# Patient Record
Sex: MALE | Race: WHITE | NOT HISPANIC OR LATINO | Employment: OTHER | URBAN - METROPOLITAN AREA
[De-identification: names, ages, dates, MRNs, and addresses within clinical notes are randomized per-mention and may not be internally consistent; named-entity substitution may affect disease eponyms.]

---

## 2021-08-02 ENCOUNTER — HOSPITAL ENCOUNTER (OUTPATIENT)
Facility: MEDICAL CENTER | Age: 76
End: 2021-08-03
Attending: EMERGENCY MEDICINE | Admitting: STUDENT IN AN ORGANIZED HEALTH CARE EDUCATION/TRAINING PROGRAM
Payer: MEDICARE

## 2021-08-02 ENCOUNTER — APPOINTMENT (OUTPATIENT)
Dept: RADIOLOGY | Facility: MEDICAL CENTER | Age: 76
End: 2021-08-02
Attending: EMERGENCY MEDICINE
Payer: MEDICARE

## 2021-08-02 DIAGNOSIS — R55 SYNCOPE, UNSPECIFIED SYNCOPE TYPE: ICD-10-CM

## 2021-08-02 LAB
ALBUMIN SERPL BCP-MCNC: 4.9 G/DL (ref 3.2–4.9)
ALBUMIN/GLOB SERPL: 1.3 G/DL
ALP SERPL-CCNC: 87 U/L (ref 30–99)
ALT SERPL-CCNC: 13 U/L (ref 2–50)
AMPHET UR QL SCN: NEGATIVE
ANION GAP SERPL CALC-SCNC: 16 MMOL/L (ref 7–16)
APPEARANCE UR: CLEAR
AST SERPL-CCNC: 25 U/L (ref 12–45)
BACTERIA #/AREA URNS HPF: NEGATIVE /HPF
BARBITURATES UR QL SCN: NEGATIVE
BASOPHILS # BLD AUTO: 0.8 % (ref 0–1.8)
BASOPHILS # BLD: 0.09 K/UL (ref 0–0.12)
BENZODIAZ UR QL SCN: POSITIVE
BILIRUB SERPL-MCNC: 0.4 MG/DL (ref 0.1–1.5)
BILIRUB UR QL STRIP.AUTO: NEGATIVE
BUN SERPL-MCNC: 15 MG/DL (ref 8–22)
BZE UR QL SCN: NEGATIVE
CALCIUM SERPL-MCNC: 10.2 MG/DL (ref 8.5–10.5)
CANNABINOIDS UR QL SCN: POSITIVE
CHLORIDE SERPL-SCNC: 103 MMOL/L (ref 96–112)
CO2 SERPL-SCNC: 24 MMOL/L (ref 20–33)
COLOR UR: YELLOW
CREAT SERPL-MCNC: 0.73 MG/DL (ref 0.5–1.4)
EKG IMPRESSION: NORMAL
EOSINOPHIL # BLD AUTO: 0.14 K/UL (ref 0–0.51)
EOSINOPHIL NFR BLD: 1.2 % (ref 0–6.9)
EPI CELLS #/AREA URNS HPF: NEGATIVE /HPF
ERYTHROCYTE [DISTWIDTH] IN BLOOD BY AUTOMATED COUNT: 49.5 FL (ref 35.9–50)
ETHANOL BLD-MCNC: <10.1 MG/DL (ref 0–10)
GLOBULIN SER CALC-MCNC: 3.9 G/DL (ref 1.9–3.5)
GLUCOSE SERPL-MCNC: 108 MG/DL (ref 65–99)
GLUCOSE UR STRIP.AUTO-MCNC: NEGATIVE MG/DL
HCT VFR BLD AUTO: 45.8 % (ref 42–52)
HGB BLD-MCNC: 15.6 G/DL (ref 14–18)
HYALINE CASTS #/AREA URNS LPF: ABNORMAL /LPF
IMM GRANULOCYTES # BLD AUTO: 0.06 K/UL (ref 0–0.11)
IMM GRANULOCYTES NFR BLD AUTO: 0.5 % (ref 0–0.9)
KETONES UR STRIP.AUTO-MCNC: ABNORMAL MG/DL
LACTATE BLD-SCNC: 2.4 MMOL/L (ref 0.5–2)
LEUKOCYTE ESTERASE UR QL STRIP.AUTO: NEGATIVE
LYMPHOCYTES # BLD AUTO: 1.59 K/UL (ref 1–4.8)
LYMPHOCYTES NFR BLD: 13.8 % (ref 22–41)
MCH RBC QN AUTO: 33.2 PG (ref 27–33)
MCHC RBC AUTO-ENTMCNC: 34.1 G/DL (ref 33.7–35.3)
MCV RBC AUTO: 97.4 FL (ref 81.4–97.8)
METHADONE UR QL SCN: NEGATIVE
MICRO URNS: ABNORMAL
MONOCYTES # BLD AUTO: 0.77 K/UL (ref 0–0.85)
MONOCYTES NFR BLD AUTO: 6.7 % (ref 0–13.4)
NEUTROPHILS # BLD AUTO: 8.84 K/UL (ref 1.82–7.42)
NEUTROPHILS NFR BLD: 77 % (ref 44–72)
NITRITE UR QL STRIP.AUTO: NEGATIVE
NRBC # BLD AUTO: 0 K/UL
NRBC BLD-RTO: 0 /100 WBC
OPIATES UR QL SCN: NEGATIVE
OXYCODONE UR QL SCN: POSITIVE
PCP UR QL SCN: NEGATIVE
PH UR STRIP.AUTO: 5.5 [PH] (ref 5–8)
PLATELET # BLD AUTO: 276 K/UL (ref 164–446)
PMV BLD AUTO: 11.1 FL (ref 9–12.9)
POTASSIUM SERPL-SCNC: 4.4 MMOL/L (ref 3.6–5.5)
PROPOXYPH UR QL SCN: NEGATIVE
PROT SERPL-MCNC: 8.8 G/DL (ref 6–8.2)
PROT UR QL STRIP: 30 MG/DL
RBC # BLD AUTO: 4.7 M/UL (ref 4.7–6.1)
RBC # URNS HPF: ABNORMAL /HPF
RBC UR QL AUTO: NEGATIVE
SODIUM SERPL-SCNC: 143 MMOL/L (ref 135–145)
SP GR UR STRIP.AUTO: 1.02
TROPONIN T SERPL-MCNC: 28 NG/L (ref 6–19)
UROBILINOGEN UR STRIP.AUTO-MCNC: 0.2 MG/DL
WBC # BLD AUTO: 11.5 K/UL (ref 4.8–10.8)
WBC #/AREA URNS HPF: ABNORMAL /HPF

## 2021-08-02 PROCEDURE — 80053 COMPREHEN METABOLIC PANEL: CPT

## 2021-08-02 PROCEDURE — 71045 X-RAY EXAM CHEST 1 VIEW: CPT

## 2021-08-02 PROCEDURE — 82077 ASSAY SPEC XCP UR&BREATH IA: CPT

## 2021-08-02 PROCEDURE — 99285 EMERGENCY DEPT VISIT HI MDM: CPT

## 2021-08-02 PROCEDURE — 83605 ASSAY OF LACTIC ACID: CPT

## 2021-08-02 PROCEDURE — 81001 URINALYSIS AUTO W/SCOPE: CPT | Mod: XU

## 2021-08-02 PROCEDURE — 83735 ASSAY OF MAGNESIUM: CPT

## 2021-08-02 PROCEDURE — 80307 DRUG TEST PRSMV CHEM ANLYZR: CPT

## 2021-08-02 PROCEDURE — 85025 COMPLETE CBC W/AUTO DIFF WBC: CPT

## 2021-08-02 PROCEDURE — 70450 CT HEAD/BRAIN W/O DYE: CPT | Mod: MG

## 2021-08-02 PROCEDURE — 93005 ELECTROCARDIOGRAM TRACING: CPT | Performed by: EMERGENCY MEDICINE

## 2021-08-02 PROCEDURE — 93005 ELECTROCARDIOGRAM TRACING: CPT

## 2021-08-02 PROCEDURE — 84484 ASSAY OF TROPONIN QUANT: CPT

## 2021-08-02 PROCEDURE — 71275 CT ANGIOGRAPHY CHEST: CPT | Mod: ME

## 2021-08-03 ENCOUNTER — APPOINTMENT (OUTPATIENT)
Dept: RADIOLOGY | Facility: MEDICAL CENTER | Age: 76
End: 2021-08-03
Attending: STUDENT IN AN ORGANIZED HEALTH CARE EDUCATION/TRAINING PROGRAM
Payer: MEDICARE

## 2021-08-03 ENCOUNTER — APPOINTMENT (OUTPATIENT)
Dept: CARDIOLOGY | Facility: MEDICAL CENTER | Age: 76
End: 2021-08-03
Attending: STUDENT IN AN ORGANIZED HEALTH CARE EDUCATION/TRAINING PROGRAM
Payer: MEDICARE

## 2021-08-03 VITALS
RESPIRATION RATE: 18 BRPM | HEIGHT: 69 IN | TEMPERATURE: 98 F | HEART RATE: 72 BPM | SYSTOLIC BLOOD PRESSURE: 181 MMHG | BODY MASS INDEX: 25.7 KG/M2 | DIASTOLIC BLOOD PRESSURE: 91 MMHG | WEIGHT: 173.5 LBS | OXYGEN SATURATION: 92 %

## 2021-08-03 PROBLEM — R55 SYNCOPE: Status: RESOLVED | Noted: 2021-08-03 | Resolved: 2021-08-03

## 2021-08-03 PROBLEM — R55 SYNCOPE: Status: ACTIVE | Noted: 2021-08-03

## 2021-08-03 PROBLEM — Z86.79 HISTORY OF CAROTID STENOSIS: Status: ACTIVE | Noted: 2021-08-03

## 2021-08-03 PROBLEM — I21.4 NSTEMI (NON-ST ELEVATED MYOCARDIAL INFARCTION) (HCC): Status: RESOLVED | Noted: 2021-08-03 | Resolved: 2021-08-03

## 2021-08-03 PROBLEM — D72.829 LEUKOCYTOSIS: Status: ACTIVE | Noted: 2021-08-03

## 2021-08-03 PROBLEM — I95.1 ORTHOSTATIC HYPOTENSION: Status: ACTIVE | Noted: 2021-08-03

## 2021-08-03 PROBLEM — I10 ESSENTIAL HYPERTENSION: Status: ACTIVE | Noted: 2021-08-03

## 2021-08-03 PROBLEM — F19.10 POLYSUBSTANCE ABUSE (HCC): Status: ACTIVE | Noted: 2021-08-03

## 2021-08-03 PROBLEM — I21.4 NSTEMI (NON-ST ELEVATED MYOCARDIAL INFARCTION) (HCC): Status: ACTIVE | Noted: 2021-08-03

## 2021-08-03 LAB
ANION GAP SERPL CALC-SCNC: 12 MMOL/L (ref 7–16)
BASOPHILS # BLD AUTO: 0.9 % (ref 0–1.8)
BASOPHILS # BLD: 0.1 K/UL (ref 0–0.12)
BUN SERPL-MCNC: 17 MG/DL (ref 8–22)
CALCIUM SERPL-MCNC: 9.3 MG/DL (ref 8.5–10.5)
CHLORIDE SERPL-SCNC: 106 MMOL/L (ref 96–112)
CO2 SERPL-SCNC: 25 MMOL/L (ref 20–33)
CREAT SERPL-MCNC: 0.53 MG/DL (ref 0.5–1.4)
EOSINOPHIL # BLD AUTO: 0.41 K/UL (ref 0–0.51)
EOSINOPHIL NFR BLD: 3.7 % (ref 0–6.9)
ERYTHROCYTE [DISTWIDTH] IN BLOOD BY AUTOMATED COUNT: 49.1 FL (ref 35.9–50)
GLUCOSE SERPL-MCNC: 83 MG/DL (ref 65–99)
HCT VFR BLD AUTO: 43.6 % (ref 42–52)
HGB BLD-MCNC: 14.9 G/DL (ref 14–18)
IMM GRANULOCYTES # BLD AUTO: 0.05 K/UL (ref 0–0.11)
IMM GRANULOCYTES NFR BLD AUTO: 0.5 % (ref 0–0.9)
LACTATE BLD-SCNC: 1.4 MMOL/L (ref 0.5–2)
LACTATE BLD-SCNC: 1.8 MMOL/L (ref 0.5–2)
LACTATE BLD-SCNC: 1.9 MMOL/L (ref 0.5–2)
LV EJECT FRACT  99904: 40
LV EJECT FRACT MOD 2C 99903: 46.47
LV EJECT FRACT MOD 4C 99902: 40.1
LV EJECT FRACT MOD BP 99901: 42.91
LYMPHOCYTES # BLD AUTO: 2.55 K/UL (ref 1–4.8)
LYMPHOCYTES NFR BLD: 23.2 % (ref 22–41)
MAGNESIUM SERPL-MCNC: 2.3 MG/DL (ref 1.5–2.5)
MCH RBC QN AUTO: 33.2 PG (ref 27–33)
MCHC RBC AUTO-ENTMCNC: 34.2 G/DL (ref 33.7–35.3)
MCV RBC AUTO: 97.1 FL (ref 81.4–97.8)
MONOCYTES # BLD AUTO: 0.99 K/UL (ref 0–0.85)
MONOCYTES NFR BLD AUTO: 9 % (ref 0–13.4)
NEUTROPHILS # BLD AUTO: 6.9 K/UL (ref 1.82–7.42)
NEUTROPHILS NFR BLD: 62.7 % (ref 44–72)
NRBC # BLD AUTO: 0 K/UL
NRBC BLD-RTO: 0 /100 WBC
PLATELET # BLD AUTO: 291 K/UL (ref 164–446)
PMV BLD AUTO: 11.5 FL (ref 9–12.9)
POTASSIUM SERPL-SCNC: 3.7 MMOL/L (ref 3.6–5.5)
RBC # BLD AUTO: 4.49 M/UL (ref 4.7–6.1)
SODIUM SERPL-SCNC: 143 MMOL/L (ref 135–145)
WBC # BLD AUTO: 11 K/UL (ref 4.8–10.8)

## 2021-08-03 PROCEDURE — 93306 TTE W/DOPPLER COMPLETE: CPT

## 2021-08-03 PROCEDURE — 80048 BASIC METABOLIC PNL TOTAL CA: CPT

## 2021-08-03 PROCEDURE — G0378 HOSPITAL OBSERVATION PER HR: HCPCS

## 2021-08-03 PROCEDURE — 700105 HCHG RX REV CODE 258: Performed by: NURSE PRACTITIONER

## 2021-08-03 PROCEDURE — A9270 NON-COVERED ITEM OR SERVICE: HCPCS | Performed by: STUDENT IN AN ORGANIZED HEALTH CARE EDUCATION/TRAINING PROGRAM

## 2021-08-03 PROCEDURE — 700111 HCHG RX REV CODE 636 W/ 250 OVERRIDE (IP): Performed by: STUDENT IN AN ORGANIZED HEALTH CARE EDUCATION/TRAINING PROGRAM

## 2021-08-03 PROCEDURE — 96372 THER/PROPH/DIAG INJ SC/IM: CPT

## 2021-08-03 PROCEDURE — 93880 EXTRACRANIAL BILAT STUDY: CPT

## 2021-08-03 PROCEDURE — 700102 HCHG RX REV CODE 250 W/ 637 OVERRIDE(OP): Performed by: STUDENT IN AN ORGANIZED HEALTH CARE EDUCATION/TRAINING PROGRAM

## 2021-08-03 PROCEDURE — 83605 ASSAY OF LACTIC ACID: CPT | Mod: 91

## 2021-08-03 PROCEDURE — 97165 OT EVAL LOW COMPLEX 30 MIN: CPT

## 2021-08-03 PROCEDURE — 94664 DEMO&/EVAL PT USE INHALER: CPT

## 2021-08-03 PROCEDURE — 700117 HCHG RX CONTRAST REV CODE 255: Performed by: EMERGENCY MEDICINE

## 2021-08-03 PROCEDURE — 85025 COMPLETE CBC W/AUTO DIFF WBC: CPT

## 2021-08-03 PROCEDURE — 99236 HOSP IP/OBS SAME DATE HI 85: CPT | Performed by: STUDENT IN AN ORGANIZED HEALTH CARE EDUCATION/TRAINING PROGRAM

## 2021-08-03 PROCEDURE — 93306 TTE W/DOPPLER COMPLETE: CPT | Mod: 26 | Performed by: INTERNAL MEDICINE

## 2021-08-03 RX ORDER — ALLOPURINOL 100 MG/1
100 TABLET ORAL DAILY
COMMUNITY
Start: 2021-04-20

## 2021-08-03 RX ORDER — SODIUM CHLORIDE 9 MG/ML
500 INJECTION, SOLUTION INTRAVENOUS ONCE
Status: COMPLETED | OUTPATIENT
Start: 2021-08-03 | End: 2021-08-03

## 2021-08-03 RX ORDER — FUROSEMIDE 40 MG/1
20 TABLET ORAL 2 TIMES DAILY
Status: DISCONTINUED | OUTPATIENT
Start: 2021-08-03 | End: 2021-08-03 | Stop reason: HOSPADM

## 2021-08-03 RX ORDER — BISACODYL 10 MG
10 SUPPOSITORY, RECTAL RECTAL
Status: DISCONTINUED | OUTPATIENT
Start: 2021-08-03 | End: 2021-08-03 | Stop reason: HOSPADM

## 2021-08-03 RX ORDER — FINASTERIDE 5 MG/1
5 TABLET, FILM COATED ORAL EVERY MORNING
Status: SHIPPED | COMMUNITY
End: 2024-03-04 | Stop reason: SDUPTHER

## 2021-08-03 RX ORDER — ONDANSETRON 2 MG/ML
4 INJECTION INTRAMUSCULAR; INTRAVENOUS EVERY 4 HOURS PRN
Status: DISCONTINUED | OUTPATIENT
Start: 2021-08-03 | End: 2021-08-03 | Stop reason: HOSPADM

## 2021-08-03 RX ORDER — CLOPIDOGREL BISULFATE 75 MG/1
75 TABLET ORAL DAILY
Status: DISCONTINUED | OUTPATIENT
Start: 2021-08-03 | End: 2021-08-03 | Stop reason: HOSPADM

## 2021-08-03 RX ORDER — ENALAPRILAT 1.25 MG/ML
1.25 INJECTION INTRAVENOUS EVERY 6 HOURS PRN
Status: DISCONTINUED | OUTPATIENT
Start: 2021-08-03 | End: 2021-08-03 | Stop reason: HOSPADM

## 2021-08-03 RX ORDER — HEPARIN SODIUM 5000 [USP'U]/ML
5000 INJECTION, SOLUTION INTRAVENOUS; SUBCUTANEOUS EVERY 8 HOURS
Status: DISCONTINUED | OUTPATIENT
Start: 2021-08-03 | End: 2021-08-03 | Stop reason: HOSPADM

## 2021-08-03 RX ORDER — HYDROMORPHONE HYDROCHLORIDE 1 MG/ML
0.25 INJECTION, SOLUTION INTRAMUSCULAR; INTRAVENOUS; SUBCUTANEOUS
Status: DISCONTINUED | OUTPATIENT
Start: 2021-08-03 | End: 2021-08-03 | Stop reason: HOSPADM

## 2021-08-03 RX ORDER — OXYCODONE HYDROCHLORIDE 5 MG/1
5 TABLET ORAL
Status: DISCONTINUED | OUTPATIENT
Start: 2021-08-03 | End: 2021-08-03 | Stop reason: HOSPADM

## 2021-08-03 RX ORDER — POLYETHYLENE GLYCOL 3350 17 G/17G
1 POWDER, FOR SOLUTION ORAL
Status: DISCONTINUED | OUTPATIENT
Start: 2021-08-03 | End: 2021-08-03 | Stop reason: HOSPADM

## 2021-08-03 RX ORDER — LABETALOL HYDROCHLORIDE 5 MG/ML
10 INJECTION, SOLUTION INTRAVENOUS EVERY 4 HOURS PRN
Status: DISCONTINUED | OUTPATIENT
Start: 2021-08-03 | End: 2021-08-03 | Stop reason: HOSPADM

## 2021-08-03 RX ORDER — ACETAMINOPHEN 325 MG/1
650 TABLET ORAL EVERY 6 HOURS PRN
Status: DISCONTINUED | OUTPATIENT
Start: 2021-08-03 | End: 2021-08-03 | Stop reason: HOSPADM

## 2021-08-03 RX ORDER — ONDANSETRON 4 MG/1
4 TABLET, ORALLY DISINTEGRATING ORAL EVERY 4 HOURS PRN
Status: DISCONTINUED | OUTPATIENT
Start: 2021-08-03 | End: 2021-08-03 | Stop reason: HOSPADM

## 2021-08-03 RX ORDER — OXYCODONE HYDROCHLORIDE 5 MG/1
2.5 TABLET ORAL
Status: DISCONTINUED | OUTPATIENT
Start: 2021-08-03 | End: 2021-08-03 | Stop reason: HOSPADM

## 2021-08-03 RX ORDER — METOPROLOL SUCCINATE 25 MG/1
25 TABLET, EXTENDED RELEASE ORAL DAILY
Status: DISCONTINUED | OUTPATIENT
Start: 2021-08-03 | End: 2021-08-03 | Stop reason: HOSPADM

## 2021-08-03 RX ORDER — AMOXICILLIN 250 MG
2 CAPSULE ORAL 2 TIMES DAILY
Status: DISCONTINUED | OUTPATIENT
Start: 2021-08-03 | End: 2021-08-03 | Stop reason: HOSPADM

## 2021-08-03 RX ORDER — CELECOXIB 200 MG/1
200 CAPSULE ORAL DAILY
Status: SHIPPED | COMMUNITY
End: 2024-02-13 | Stop reason: SINTOL

## 2021-08-03 RX ORDER — ALBUTEROL SULFATE 90 UG/1
2 AEROSOL, METERED RESPIRATORY (INHALATION) EVERY 4 HOURS PRN
Status: SHIPPED | COMMUNITY
End: 2024-02-13 | Stop reason: SINTOL

## 2021-08-03 RX ORDER — LORAZEPAM 0.5 MG
1 TABLET ORAL EVERY MORNING
Status: SHIPPED | COMMUNITY
End: 2024-02-13 | Stop reason: CLARIF

## 2021-08-03 RX ORDER — TAMSULOSIN HYDROCHLORIDE 0.4 MG/1
0.4 CAPSULE ORAL
Status: DISCONTINUED | OUTPATIENT
Start: 2021-08-03 | End: 2021-08-03 | Stop reason: HOSPADM

## 2021-08-03 RX ADMIN — HEPARIN SODIUM 5000 UNITS: 5000 INJECTION, SOLUTION INTRAVENOUS; SUBCUTANEOUS at 06:18

## 2021-08-03 RX ADMIN — SODIUM CHLORIDE 500 ML: 9 INJECTION, SOLUTION INTRAVENOUS at 12:41

## 2021-08-03 RX ADMIN — CLOPIDOGREL BISULFATE 75 MG: 75 TABLET ORAL at 06:18

## 2021-08-03 RX ADMIN — OXYCODONE 5 MG: 5 TABLET ORAL at 02:13

## 2021-08-03 RX ADMIN — IOHEXOL 50 ML: 350 INJECTION, SOLUTION INTRAVENOUS at 00:15

## 2021-08-03 RX ADMIN — TAMSULOSIN HYDROCHLORIDE 0.4 MG: 0.4 CAPSULE ORAL at 10:50

## 2021-08-03 RX ADMIN — OXYCODONE 5 MG: 5 TABLET ORAL at 09:35

## 2021-08-03 RX ADMIN — FUROSEMIDE 20 MG: 40 TABLET ORAL at 06:18

## 2021-08-03 RX ADMIN — OXYCODONE 5 MG: 5 TABLET ORAL at 15:06

## 2021-08-03 ASSESSMENT — LIFESTYLE VARIABLES
HAVE PEOPLE ANNOYED YOU BY CRITICIZING YOUR DRINKING: NO
ON A TYPICAL DAY WHEN YOU DRINK ALCOHOL HOW MANY DRINKS DO YOU HAVE: 0
AVERAGE NUMBER OF DAYS PER WEEK YOU HAVE A DRINK CONTAINING ALCOHOL: 0
HOW MANY TIMES IN THE PAST YEAR HAVE YOU HAD 5 OR MORE DRINKS IN A DAY: 0
TOTAL SCORE: 0
ALCOHOL_USE: YES
EVER FELT BAD OR GUILTY ABOUT YOUR DRINKING: NO
TOTAL SCORE: 0
TOTAL SCORE: 0
EVER HAD A DRINK FIRST THING IN THE MORNING TO STEADY YOUR NERVES TO GET RID OF A HANGOVER: NO
DOES PATIENT WANT TO TALK TO SOMEONE ABOUT QUITTING: NO
HAVE YOU EVER FELT YOU SHOULD CUT DOWN ON YOUR DRINKING: NO
DOES PATIENT WANT TO STOP DRINKING: YES
CONSUMPTION TOTAL: NEGATIVE

## 2021-08-03 ASSESSMENT — COGNITIVE AND FUNCTIONAL STATUS - GENERAL
SUGGESTED CMS G CODE MODIFIER DAILY ACTIVITY: CH
DAILY ACTIVITIY SCORE: 24

## 2021-08-03 ASSESSMENT — ENCOUNTER SYMPTOMS
COUGH: 0
SHORTNESS OF BREATH: 0
FEVER: 0
WHEEZING: 0
HEADACHES: 0
DOUBLE VISION: 0
PALPITATIONS: 0
SORE THROAT: 1
BACK PAIN: 0
DEPRESSION: 0
ABDOMINAL PAIN: 0
CHILLS: 0
NAUSEA: 0
HEARTBURN: 0
NECK PAIN: 0
VOMITING: 0
FOCAL WEAKNESS: 0
CONSTIPATION: 0
FALLS: 1
LOSS OF CONSCIOUSNESS: 1
BLURRED VISION: 0
INSOMNIA: 0
BLOOD IN STOOL: 0
WEAKNESS: 0
BRUISES/BLEEDS EASILY: 0
DIARRHEA: 0

## 2021-08-03 ASSESSMENT — PATIENT HEALTH QUESTIONNAIRE - PHQ9
2. FEELING DOWN, DEPRESSED, IRRITABLE, OR HOPELESS: NOT AT ALL
1. LITTLE INTEREST OR PLEASURE IN DOING THINGS: NOT AT ALL
SUM OF ALL RESPONSES TO PHQ9 QUESTIONS 1 AND 2: 0

## 2021-08-03 ASSESSMENT — FIBROSIS 4 INDEX
FIB4 SCORE: 1.88
FIB4 SCORE: 1.88

## 2021-08-03 ASSESSMENT — PAIN DESCRIPTION - PAIN TYPE
TYPE: CHRONIC PAIN

## 2021-08-03 ASSESSMENT — ACTIVITIES OF DAILY LIVING (ADL): TOILETING: INDEPENDENT

## 2021-08-03 NOTE — ASSESSMENT & PLAN NOTE
Likely NSTEMI type II as patient had hypotensive event and syncopized  No ST segment elevations or depressions on twelve-lead EKG  Trend troponin

## 2021-08-03 NOTE — ED NOTES
Med Rec completed: per patient at bedside with wife via phone  Preferred Pharmacy: [facility]  Allergies:  No Known Allergies    No ORAL antibiotics in last 14 days    Home Medications:  Medication Sig Comments   • finasteride (PROSCAR) 5 MG Tab Take 5 mg by mouth every morning.    • aspirin EC (ECOTRIN) 81 MG Tablet Delayed Response Take 81 mg by mouth every day.    • celecoxib (CELEBREX) 200 MG Cap Take 200 mg by mouth every day.    • allopurinol (ZYLOPRIM) 100 MG Tab Take 100 mg by mouth every day.    • albuterol 108 (90 Base) MCG/ACT Aero Soln inhalation aerosol Inhale 2 Puffs every four hours as needed for Shortness of Breath.    • GLUCOSAMINE-CHONDROITIN PO Take 1 tablet by mouth every day.    • Probiotic Product (PROBIOTIC PO) Take 1 capsule by mouth every day.    • Misc Natural Products (TART MILLER ADVANCED) Cap Take 1 capsule by mouth every morning.    • Budeson-Glycopyrrol-Formoterol (BREZTRI AEROSPHERE) 160-9-4.8 MCG/ACT Aerosol Inhale 2 Puffs 2 times a day.    • tamsulosin (FLOMAX) 0.4 MG capsule Take 0.4 mg by mouth ONE-HALF HOUR AFTER BREAKFAST.    • oxyCODONE immediate release (ROXICODONE) 10 MG immediate release tablet Take 10 mg by mouth every four hours as needed (pain). Max of 5 tabs daily    • metoprolol SR (TOPROL XL) 25 MG TABLET SR 24 HR Take 25 mg by mouth every day.    • furosemide (LASIX) 20 MG Tab Take 20 mg by mouth every day.    • clopidogrel (PLAVIX) 75 MG TABS Take 75 mg by mouth every morning.    • omeprazole (PRILOSEC) 20 MG delayed-release capsule Take 20 mg by mouth every 48 hours.
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
## 2021-08-03 NOTE — THERAPY
"Occupational Therapy   Initial Evaluation     Patient Name: Slade Adam  Age:  75 y.o., Sex:  male  Medical Record #: [id number]  Today's Date: 8/3/2021     Precautions  Precautions: Fall Risk    Assessment  Patient is 75 y.o. male who presentsed to the ED for evaluation following a syncopal episode prior to arrival. PMHx: NSTEMI and cartotid stenosis, COPD, BPH, CHF,HTN. Pt appears near/ at functional baseline with ADLs, activity tolerance, and functional mobility. Pt reports having good support in home setting, however wife is not home at all times to help. Pt will not be actively followed for OT services at this time, however may be seen if requested by physician for 1 more visit w/ in 30 days to address any d/c or equipment needs.     Plan  Recommend Occupational Therapy d/c needs only    DC Equipment Recommendations: None  Discharge Recommendations: Anticipate that the patient will have no further occupational therapy needs after discharge from the hospital     Subjective  \"I am just ready to go home\"     Objective   08/03/21 1012   Initial Contact Note    Initial Contact Note Order Received and Verified, Occupational Therapy Evaluation in Progress with Full Report to Follow.   Prior Living Situation   Prior Services Home-Independent   Housing / Facility 1 Story House   Steps Into Home 3   Steps In Home 0   Bathroom Set up Bathtub / Shower Combination   Equipment Owned    Lives with - Patient's Self Care Capacity Spouse   Comments Pt lives with wife, wife works    Prior Level of ADL Function   Self Feeding Independent   Grooming / Hygiene Independent   Bathing Independent   Dressing Independent   Toileting Independent   Prior Level of IADL Function   Medication Management Independent   Laundry Independent   Kitchen Mobility Independent   Finances Independent   Home Management Independent   Shopping Independent   Prior Level Of Mobility Independent Without Device in Community   Driving / Transportation Driving " Independent   Occupation (Pre-Hospital Vocational) Retired Due To Age   Leisure Interests Gardening   History of Falls   History of Falls No   Precautions   Precautions Fall Risk   Pain   Pain Scales 0 to 10 Scale    Intervention Ambulation / Increased Activity   Non Verbal Scale  Calm  (emotional/ tearful at end of session, pt very anxious )   Pain 0 - 10 Group   Location Back;Leg   Location Orientation Posterior;Right   Therapist Pain Assessment Post Activity Pain Same as Prior to Activity;Nurse Notified  (no number attained by pt)   Cognition    Cognition / Consciousness WDL   Level of Consciousness Alert   Comments slightly impulsive stating he wants to and is ready to go home   Passive ROM Upper Body   Passive ROM Upper Body WDL   Active ROM Upper Body   Active ROM Upper Body  WDL   Strength Upper Body   Upper Body Strength  WDL   Sensation Upper Body   Upper Extremity Sensation  WDL   Upper Body Muscle Tone   Upper Body Muscle Tone  WDL   Coordination Upper Body   Coordination WDL   Balance Assessment   Sitting Balance (Static) Fair +   Sitting Balance (Dynamic) Fair +   Standing Balance (Static) Fair +   Standing Balance (Dynamic) Fair +   Weight Shift Sitting Good   Weight Shift Standing Good   Bed Mobility    Supine to Sit Supervised   Sit to Supine Supervised   Scooting Supervised   ADL Assessment   Eating Supervision  (pt has slight tremors while eatting)   Grooming Supervision;Standing   Upper Body Dressing Supervision   Lower Body Dressing Supervision   How much help from another person does the patient currently need...   6 Clicks Daily Activity Score 24   Functional Mobility   Sit to Stand Supervised   Bed, Chair, Wheelchair Transfer Supervised   Transfer Method Stand Step   Comments no AD   Visual Perception   Visual Perception  WDL   Edema / Skin Assessment   Edema / Skin  WDL   Activity Tolerance   Comments appears near/ at functinoal baseline   Short Term Goals   Short Term Goal # 1 pt will have  no further acute OT needs   Education Group   Education Provided Role of Occupational Therapist;Activities of Daily Living   Role of Occupational Therapist Patient Response Patient;Acceptance;Explanation;Verbal Demonstration   ADL Patient Response Patient;Acceptance;Explanation;Verbal Demonstration;Action Demonstration   Anticipated Discharge Equipment and Recommendations   DC Equipment Recommendations None   Discharge Recommendations Anticipate that the patient will have no further occupational therapy needs after discharge from the hospital   Interdisciplinary Plan of Care Collaboration   IDT Collaboration with  Nursing   Patient Position at End of Therapy In Bed;Tray Table within Reach;Phone within Reach;Call Light within Reach   Collaboration Comments nsg notified

## 2021-08-03 NOTE — ED PROVIDER NOTES
"ED Provider Note    Scribed for Elysia Perry M.D. by Josue Cortez-Reyes. 8/2/2021  7:07 PM    Primary care provider: Gloria Maurice M.D.  Means of arrival: EMS  History obtained from: Patient  History limited by: None  CHIEF COMPLAINT  Chief Complaint   Patient presents with   • Syncope       HPI  Slade Adam is a 75 y.o. male who presents to the ED for evaluation following a syncopal episode prior to arrival. The patient states that he was sitting on the toilet talking to his wife in the other room when he suddenly lost consciousness. He states that he felt fine prior to losing consciousness.  He had no symptoms prior to syncope.  Patient is denying any dizziness, headache, chest pain, abdominal pain, back pain, or shortness of breath. The patient's friend states that the patient was a little more shaky than normal through out the day to day but otherwise okay. The patient denies any additional vomiting, diarrhea, dysuria, numbness, weakness, or tingling. The patient also denies any neck pain or head pain secondary to his fall. He states that he recently saw his urologist for evaluation of a bladder issue (difficulty fully emptying his bladder). The patient denies smoking cigarettes but notes that he occassionally smokes \"pot\". He adds that he normally does not consume alcohol because of his gout; however, he did consume alcohol today. He states that he has a history of asthma noting that he uses an inhaler. The patient states that he has had his gallbladder removed in the past. The patient states that he has a history of heart failure; noting that he is compliant with his medication. He states that he has been vaccinated for COVID. The patient adds that he is currently seeing a pulmonologist for evaluation of his COPD.  Wife called EMS.  Care flight came to their home which is located outside of [place].  Upon arrival patient had dilated pupils.  He was completely unresponsive with a GCS of 3.  EMS was " just preparing to intubate him when he suddenly woke up.  He was flown here to [facility] for further evaluation.    Wife, who arrives to ER after patient, says that she witnessed the patient having a brief episode of slurred or garbled speech and then he passed out with his eyes wide open.  She says he always had pinpoint pupils.  She does not think he had overdose.  He did get a little rigid.  Patient was unconscious for about 4 to 5 minutes before EMS arrived.  She said he had some gurgling breaths.  EMS arrived and was bagging him.  They drilled an IO into his right tibia.  Right before they drilled the IO he started to come around a little bit.  He then had a passed out again.  They gave him 2 of Versed because when he started to come to again he was a little combative.  No laurie seizure-like activity.  Wife says he has been on narcotics for many years but does not think he overdosed.  She did not check a pulse during this time.    REVIEW OF SYSTEMS  See HPI for further details. All other systems are negative.  Pertinent positives include loss of consciousness. Pertinent negatives include no dizziness, headache, chest pain, abdominal pain, shortness of breath, vomiting, diarrhea, dysuria, numbness, weakness, tingling, neck, or head pain. .     PAST MEDICAL HISTORY  Past Medical History:   Diagnosis Date   • BPH (benign prostatic hyperplasia)    • Chronic obstructive pulmonary disease (HCC)    • Congestive heart failure (HCC)    • Hypertension    • Osteoarthritis        FAMILY HISTORY  History reviewed. No pertinent family history.    SOCIAL HISTORY  Social History     Socioeconomic History   • Marital status: Single     Spouse name: Not noted   • Number of children: Not noted   • Years of education: Not noted   • Highest education level: Not noted   Occupational History   • Not on file   Tobacco Use   • Smoking status: Never Smoker   • Smokeless tobacco: Never Used   Substance and Sexual Activity   •  Alcohol use: No   • Drug use: No   • Sexual activity: Not noted   Other Topics Concern   • Not noted   Social History Narrative   • Not noted     Social Determinants of Health     Financial Resource Strain:    • Difficulty of Paying Living Expenses:    Food Insecurity:    • Worried About Running Out of Food in the Last Year:    • Ran Out of Food in the Last Year:    Transportation Needs:    • Lack of Transportation (Medical):    • Lack of Transportation (Non-Medical):    Physical Activity:    • Days of Exercise per Week:    • Minutes of Exercise per Session:    Stress:    • Feeling of Stress :    Social Connections:    • Frequency of Communication with Friends and Family:    • Frequency of Social Gatherings with Friends and Family:    • Attends [employer] Services:    • Active Member of Clubs or Organizations:    • Attends Club or Organization Meetings:    • Marital Status:    Intimate Partner Violence:    • Fear of Current or Ex-Partner:    • Emotionally Abused:    • Physically Abused:    • Sexually Abused:        SURGICAL HISTORY  Past Surgical History:   Procedure Laterality Date   • CAROTID STENT ANGIOGRAPHY     • LUMBAR FUSION POSTERIOR     • OTHER CARDIAC SURGERY     • OTHER ORTHOPEDIC SURGERY     • SHOULDER SURGERY Right        CURRENT MEDICATIONS  Current Outpatient Medications   Medication Instructions   • albuterol (PROVENTIL) 2.5 mg, Nebulization, EVERY 4 HOURS PRN   • aspirin (ASA) 325 mg, EVERY 6 HOURS PRN   • clopidogrel (PLAVIX) 75 mg, DAILY   • fentaNYL (DURAGESIC) 75 MCG/HR PATCH 72 HR 1 PATCH   • furosemide (LASIX) 20 mg, Oral, 2 TIMES DAILY   • metoprolol SR (TOPROL XL) 25 mg, Oral, DAILY   • omeprazole (PRILOSEC) 20 mg, DAILY   • oxyCODONE immediate release (ROXICODONE) 10 mg, Oral, 3 TIMES DAILY   • tamsulosin (FLOMAX) 0.4 mg, Oral, AFTER BREAKFAST       ALLERGIES  No Known Allergies    PHYSICAL EXAM  VITAL SIGNS: /77   Pulse 79   Temp 36.5 °C (97.7 °F) (Temporal)   Resp 18   Ht 1.753 m  "(5' 9\")   Wt 86.2 kg (190 lb)   SpO2 93%   BMI 28.06 kg/m²      Constitutional: Well developed, well nourished; No acute distress; Non-toxic appearance.   HENT: Normocephalic, atraumatic; Bilateral external ears normal; slightly dry mucous membranes.   Eyes: PERRL, EOMI, Conjunctiva normal. No discharge.   Neck:  Supple, nontender midline C-spine without step-off or deformity; No stridor; No nuchal rigidity.   Lymphatic: No cervical lymphadenopathy noted.   Cardiovascular: Distant but regular rate and rhythm without murmurs, rubs, or gallop.   Thorax & Lungs: No respiratory distress, slight end expiratory wheezing in right lung field, without rales or rhonchi. Nontender chest wall. No crepitus or subcutaneous air  Abdomen: Soft, nontender, bowel sounds normal. No obvious masses; No pulsatile masses; no rebound, guarding, or peritoneal signs.   Skin: Good color; warm and dry without rash or petechia.  Back: Nontender, No CVA tenderness.   Extremities: Distal radial, dorsalis pedis, posterior tibial pulses are equal bilaterally; No edema; Nontender calves or saphenous, No cyanosis, No clubbing.   Musculoskeletal: Good range of motion in all major joints. No tenderness to palpation or major deformities noted.   Neurologic: Alert & oriented x 4, clear speech    EKG  12 lead EKG interpreted by me as noted below.   EKG timed 1836.  Sinus rhythm.  Rate of 82.  Normal axis.  Multiple PVCs.  Low voltage.  No ST elevation or depression.  T waves flat in the limb leads as well as in V5 and V6.    LABS/RADIOLOGY/PROCEDURES  Results for orders placed or performed during the hospital encounter of 08/02/21   CBC WITH DIFFERENTIAL   Result Value Ref Range    WBC 11.5 (H) 4.8 - 10.8 K/uL    RBC 4.70 4.70 - 6.10 M/uL    Hemoglobin 15.6 14.0 - 18.0 g/dL    Hematocrit 45.8 42.0 - 52.0 %    MCV 97.4 81.4 - 97.8 fL    MCH 33.2 (H) 27.0 - 33.0 pg    MCHC 34.1 33.7 - 35.3 g/dL    RDW 49.5 35.9 - 50.0 fL    Platelet Count 276 164 - 446 " K/uL    MPV 11.1 9.0 - 12.9 fL    Neutrophils-Polys 77.00 (H) 44.00 - 72.00 %    Lymphocytes 13.80 (L) 22.00 - 41.00 %    Monocytes 6.70 0.00 - 13.40 %    Eosinophils 1.20 0.00 - 6.90 %    Basophils 0.80 0.00 - 1.80 %    Immature Granulocytes 0.50 0.00 - 0.90 %    Nucleated RBC 0.00 /100 WBC    Neutrophils (Absolute) 8.84 (H) 1.82 - 7.42 K/uL    Lymphs (Absolute) 1.59 1.00 - 4.80 K/uL    Monos (Absolute) 0.77 0.00 - 0.85 K/uL    Eos (Absolute) 0.14 0.00 - 0.51 K/uL    Baso (Absolute) 0.09 0.00 - 0.12 K/uL    Immature Granulocytes (abs) 0.06 0.00 - 0.11 K/uL    NRBC (Absolute) 0.00 K/uL   LACTIC ACID   Result Value Ref Range    Lactic Acid 2.4 (H) 0.5 - 2.0 mmol/L   URINALYSIS (UA)    Specimen: Urine, Clean Catch   Result Value Ref Range    Color Yellow     Character Clear     Specific Gravity 1.022 <1.035    Ph 5.5 5.0 - 8.0    Glucose Negative Negative mg/dL    Ketones Trace (A) Negative mg/dL    Protein 30 (A) Negative mg/dL    Bilirubin Negative Negative    Urobilinogen, Urine 0.2 Negative    Nitrite Negative Negative    Leukocyte Esterase Negative Negative    Occult Blood Negative Negative    Micro Urine Req Microscopic    URINE DRUG SCREEN   Result Value Ref Range    Amphetamines Urine Negative Negative    Barbiturates Negative Negative    Benzodiazepines Positive (A) Negative    Cocaine Metabolite Negative Negative    Methadone Negative Negative    Opiates Negative Negative    Oxycodone Positive (A) Negative    Phencyclidine -Pcp Negative Negative    Propoxyphene Negative Negative    Cannabinoid Metab Positive (A) Negative   URINE MICROSCOPIC (W/UA)   Result Value Ref Range    WBC 0-2 (A) /hpf    RBC 0-2 (A) /hpf    Bacteria Negative None /hpf    Epithelial Cells Negative /hpf    Hyaline Cast 3-5 (A) /lpf   Comp Metabolic Panel   Result Value Ref Range    Sodium 143 135 - 145 mmol/L    Potassium 4.4 3.6 - 5.5 mmol/L    Chloride 103 96 - 112 mmol/L    Co2 24 20 - 33 mmol/L    Anion Gap 16.0 7.0 - 16.0     Glucose 108 (H) 65 - 99 mg/dL    Bun 15 8 - 22 mg/dL    Creatinine 0.73 0.50 - 1.40 mg/dL    Calcium 10.2 8.5 - 10.5 mg/dL    AST(SGOT) 25 12 - 45 U/L    ALT(SGPT) 13 2 - 50 U/L    Alkaline Phosphatase 87 30 - 99 U/L    Total Bilirubin 0.4 0.1 - 1.5 mg/dL    Albumin 4.9 3.2 - 4.9 g/dL    Total Protein 8.8 (H) 6.0 - 8.2 g/dL    Globulin 3.9 (H) 1.9 - 3.5 g/dL    A-G Ratio 1.3 g/dL   TROPONIN   Result Value Ref Range    Troponin T 28 (H) 6 - 19 ng/L   DIAGNOSTIC ALCOHOL   Result Value Ref Range    Diagnostic Alcohol <10.1 0.0 - 10.0 mg/dL   ESTIMATED GFR   Result Value Ref Range    GFR If African American >60 >60 mL/min/1.73 m 2    GFR If Non African American >60 >60 mL/min/1.73 m 2   EKG   Result Value Ref Range    Report       [facility]    Test Date:  2021  Pt Name:    ALEAH SOLORZANO                 Department: ER  MRN:        [id number]                      Room:       BL 14  Gender:     Male                         Technician: [id number]  :        1945                   Requested By:ER TRIAGE PROTOCOL  Order #:    [id number]                    Reading MD: Elysia Perry    Measurements  Intervals                                Axis  Rate:       82                           P:          65  MO:         173                          QRS:        -4  QRSD:       96                           T:          51  QT:         383  QTc:        446    Interpretive Statements  Sinus rhythm rate 82  Normal axis  Normal intervals  No ST elevation or depression  Multiple ventricular premature complexes  Low voltage, extremity leads  No previous ECG available for comparison  Electronically Signed On 2021 21:29:52 PDT by Elysia Perry         DX-CHEST-PORTABLE (1 VIEW)   Final Result      No acute cardiopulmonary process is seen.      CT-HEAD W/O   Final Result      1.  No acute intracranial abnormality is identified.   2.  Left frontal encephalomalacia.   3.  There are periventricular and  subcortical white matter changes present.  This finding is nonspecific and could be from previous small vessel ischemia, demyelination, or gliosis.   4.  Atrophy   5.  Paranasal sinus disease.      CT-CTA CHEST PULMONARY ARTERY W/ RECONS    (Results Pending)       COURSE & MEDICAL DECISION MAKING  Pertinent Labs & Imaging studies reviewed. (See chart for details)    7:07 PM - Patient seen and examined at bedside. Discussed plan of care, including to obtain a CT scan of his brain as well as the potential to admit him to the hospital. Patient agrees to the plan of care. Ordered for labs and imaging to evaluate his symptoms.     0030: I discussed the case with Dr. Ramires, hospitalist on-call he will kindly evaluate the patient hospitalization.      Patient presents to the ER after a syncopal episode without warning.  Report was that patient was sitting on the toilet taking off his boots.  He was talking to his wife.  Next thing he knows he is here in the ER.  Apparently patient passed out.  His pupils were dilated.  His eyes were staring up at the ceiling.  He had what sounds like either gurgling respirations or some agonal breaths.  Wife did not check her pulse during this time.  She contacted EMS and EMS arrived after about 4 to 5 minutes of him being unconscious.  They started to bag him.  He started to come around.  They drilled an IO into his tibia.  He then passed out again.  When he finally came around the second time he was combative.  They gave him Versed.  They put him in a helicopter and transferred him here.  It was reported that right before they were going to intubate him for a GCS of 3, he woke up.  He did not arrive to be intubated.  Here in the ER he is awake and alert.  He has no complaints at this time.  He does not recall having any antecedent or preceding symptoms prior to passing out.  Wife reports that his eyes were staring and wide open and that he had a little bit of rigidity of his  extremities.  No laurie seizure-like activity.  No history of seizures.  CT brain here in the ER is negative.  CT of the chest was performed as patient had some end expiratory wheezes over the right lung field with an O2 sat of 88% on room air upon arrival and syncope.  I want to rule out pulmonary embolism.  There is no PE.  EKG is nonacute.  No evidence of ST elevation or depression.  Troponin is negative.  At this time no evidence for STEMI or non-STEMI.  Patient's electrolytes are unremarkable.  Orthostatics are normal.  Vital signs are normal stable here in the ER.  Urine is clear.  He is positive for cannabis and oxycodone, which he admits to.  He is on chronic narcotics.  At this time is unclear exactly why patient had a syncopal episode.  I think he warrants further observation for rule out arrhythmia causing him to have syncope without warning.  We also need to consider seizure and MI.  I spoke with Dr. Ramires, hospitalist on-call, and he will kindly evaluate the patient hospitalization.    FINAL IMPRESSION  1. Syncope, unspecified syncope type Acute          This dictation has been created using voice recognition software. The accuracy of the dictation is limited by the abilities of the software. I expect there may be some errors of grammar and possibly content. I made every attempt to manually correct the errors within my dictation. However, errors related to voice recognition software may still exist and should be interpreted within the appropriate context.     I, Josue Cortez-Reyes (Negra), am scribing for, and in the presence of, Elysia Perry M.D..    Electronically signed by: Josue Cortez-Reyes (Negra), 8/2/2021    Elysia GARCIA M.D. personally performed the services described in this documentation, as scribed by Josue Cortez-Reyes in my presence, and it is both accurate and complete. C.    The note accurately reflects work and decisions made by me.  Elysia Perry M.D.  8/3/2021  12:18 AM

## 2021-08-03 NOTE — ED NOTES
Orthostatic Hypotension checked.     Laying:   HR 67  /65    Sitting:  HR 71  /61    Standing:  HR 64  /66    UA and UDS collected and sent.

## 2021-08-03 NOTE — PROGRESS NOTES
Monitor Summary:    SB 59 to SR 80  No ectopy    .20/.08/.38    Awaiting download of monitored rhythm strip

## 2021-08-03 NOTE — ASSESSMENT & PLAN NOTE
CTA negative for acute PE  No arrhythmias appreciated on EKG  Orthostatic vital signs  Fall precaution  Has a history of bilateral carotid stenosis per patient and family and bilateral carotid Doppler ordered and pending

## 2021-08-03 NOTE — PROGRESS NOTES
4 Eyes Skin Assessment Completed by MITZI Lincoln and MITZI Degroot.    Head WDL  Ears WDL  Nose WDL  Mouth WDL  Neck WDL  Breast/Chest WDL  Shoulder Blades WDL  Spine WDL  (R) Arm/Elbow/Hand Scab, Discoloration  (L) Arm/Elbow/Hand Scab Discoloration  Abdomen WDL  Groin WDL  Scrotum/Coccyx/Buttocks WDL  (R) Leg WDL  (L) Leg Incision  (R) Heel/Foot/Toe WDL  (L) Heel/Foot/Toe WDL          Devices In Places Tele Box, Blood Pressure Cuff and Pulse Ox      Interventions In Place Pillows    Possible Skin Injury No    Pictures Uploaded Into Epic N/A  Wound Consult Placed N/A

## 2021-08-03 NOTE — RESPIRATORY CARE
COPD EDUCATION by COPD CLINICAL EDUCATOR  8/3/2021  at  7:58 AM by Madonna Ngo RRT     Patient interviewed by COPD education team.  Patient declined to participate in full program.  A short intervention has been conducted.  A comprehensive packet including information about COPD, types of treatments to manage their disease and safe home Oxygen usage was provided and reviewed with patient at the bedside.       COPD Screen  COPD Risk Screening  Do you have a history of COPD?: Yes  Do you have a Pulmonologist?: Yes (Salazar Newell PA-C with [facility])    COPD Assessment  COPD Clinical Specialists ONLY  COPD Education Initiated: Yes--Short Intervention (Pt refussed education, COPD booklet and spacer left for Pt)  DME Company: n/a  DME Equipment Type: none  Physician Name: Gloria Maurice M.D.  Pulmonary Follow Up Appointment:  (last seen 4/29/2021)  Pulmonologist Name: Salazar Newell PA-C with [facility]  Referrals Initiated:  (declined all)  Pulmonary Rehab: Declined  Smoking Cessation: N/A (never smoked)  Palliative Care:  (declined)  Hospice: N/A  Home Health Care: Declined  [facility] Outreach: N/A (lives outside of service area)  [facility]: N/A  [facility] (lives outside of service area)  Private In-Home Care Agency: N/A  Is this a COPD exacerbation patient?: No  $ Demo/Eval of SVN's, MDI's and Aerosols: Yes (given spacer)  (OP) Pulmonary Function Testing: Yes (4/29/2021 - FEV1 1.64, ratio 60%)    Meds to Beds  Would the patient like to opt in for Bedside Medication Delivery at Discharge?: No     MY COPD ACTION PLAN     It is recommended that patients and physicians /healthcare providers complete this action plan together. This plan should be discussed at each physician visit and updated as needed.    The green, yellow and red zones show groups of symptoms of COPD. This list of symptoms is not comprehensive, and you may experience other  "symptoms. In the \"Actions\" column, your healthcare provider has recommended actions for you to take based on your symptoms.    Patient Name: Slade Adam   YOB: 1945   Last Updated on:     Green Zone:  I am doing well today Actions   •  Usual activitiy and exercise level •  Take daily medications   •  Usual amounts of cough and phlegm/mucus •  Use oxygen as prescribed   •  Sleep well at night •  Continue regular exercise/diet plan   •  Appetite is good •  At all times avoid cigarette smoke, inhaled irritants     Daily Medications (these medications are taken every day):                Yellow Zone:  I am having a bad day or a COPD flare Actions   •  More breathless than usual •  Continue daily medications   •  I have less energy for my daily activities •  Use quick relief inhaler as ordered   •  Increased or thicker phlegm/mucus •  Use oxygen as prescribed   •  Using quick relief inhaler/nebulizer more often •  Get plenty of rest   •  Swelling of ankles more than usual •  Use pursed lip breathing   •  More coughing than usual •  At all times avoid cigarette smoke, inhaled irritants   •  I feel like I have a \"chest cold\"   •  Poor sleep and my symptoms woke me up   •  My appetite is not good   •  My medicine is not helping    •  Call provider immediately if symptoms don’t improve     Continue daily medications, add rescue medications:               Medications to be used during a flare up, (as Discussed with Provider):              Red Zone:  I need urgent medical care Actions   •  Severe shortness of breath even at rest •  Call 911 or seek medical care immediately   •  Not able to do any activity because of breathing    •  Fever or shaking chills    •  Feeling confused or very drowsy     •  Chest pains    •  Coughing up blood              "

## 2021-08-03 NOTE — ED TRIAGE NOTES
"Slade Adam  75 y.o. male  Chief Complaint   Patient presents with   • Syncope     Patient brought in by [facility] by [facility] [place]. Per careflight, patient had a syncopal episode on the toilet. Patient was unresponsive, EMS reported \"a GCS of 3 with fixed and dilated pupils\". EMS was preparing for intubation and administered 2 mg of versed when patient suddenly woke up. Patient currently A&Ox3 disoriented to event, GCS 15. Patient unable to call event, denies any pain except chronic lower back pain.     Pre-hospital vitals:   /78  HR 70  Oyxgen Saturation 96% on 4 liters per nasal cannula  FSBS 156    IO placed by EMS to right tibia.     Vitals:    08/02/21 1842   BP: 132/77   Pulse: 79   Resp: 18   Temp: 36.5 °C (97.7 °F)   SpO2: 93%       "

## 2021-08-03 NOTE — ASSESSMENT & PLAN NOTE
Doppler carotid artery bilateral :  Moderate stenosis of the right internal carotid (50-69%).    Patent left carotid stent without evidence of occlusion or stenosis.     Follow with established vascular surgery group. patient has had symptomatic recurrent syncope and TIAs in the past

## 2021-08-04 NOTE — PROGRESS NOTES
Report received from MITZI Lincoln.  Patient sitting up in bed watching TV.  POC gone over with the pt and all questions answered.  Patient A & O x 4.  No apparent signs of distress.  Safety precautions in place.  Patient educated to call for assistance.  Hourly rounding in place.

## 2021-08-04 NOTE — DISCHARGE INSTRUCTIONS
Discharge Instructions    Discharged to home by car with relative. Discharged via wheelchair, hospital escort: Yes.  Special equipment needed: Not Applicable    Be sure to schedule a follow-up appointment with your primary care doctor or any specialists as instructed.     Discharge Plan:   Diet Plan: Discussed  Activity Level: Discussed  Confirmed Follow up Appointment: Patient to Call and Schedule Appointment  Confirmed Symptoms Management: Discussed  Medication Reconciliation Updated: Yes    I understand that a diet low in cholesterol, fat, and sodium is recommended for good health. Unless I have been given specific instructions below for another diet, I accept this instruction as my diet prescription.   Other diet: Heart Healthy     Special Instructions: None    · Is patient discharged on Warfarin / Coumadin?   No     Depression / Suicide Risk    As you are discharged from this [facility] facility, it is important to learn how to keep safe from harming yourself.    Recognize the warning signs:  · Abrupt changes in personality, positive or negative- including increase in energy   · Giving away possessions  · Change in eating patterns- significant weight changes-  positive or negative  · Change in sleeping patterns- unable to sleep or sleeping all the time   · Unwillingness or inability to communicate  · Depression  · Unusual sadness, discouragement and loneliness  · Talk of wanting to die  · Neglect of personal appearance   · Rebelliousness- reckless behavior  · Withdrawal from people/activities they love  · Confusion- inability to concentrate     If you or a loved one observes any of these behaviors or has concerns about self-harm, here's what you can do:  · Talk about it- your feelings and reasons for harming yourself  · Remove any means that you might use to hurt yourself (examples: pills, rope, extension cords, firearm)  · Get professional help from the community (Mental Health, Substance Abuse,  psychological counseling)  · Do not be alone:Call your Safe Contact- someone whom you trust who will be there for you.  · Call your local CRISIS HOTLINE 292-0392 or 789-531-3206  · Call your local Children's Mobile Crisis Response Team [place] (409) 802-8649 or www."[employer]  · Call the toll free National Suicide Prevention Hotlines   · National Suicide Prevention Lifeline 706-227-NOMI (4304)  · [employer] [facility] Line Network 800-SUICIDE (676-4066)          Orthostatic Hypotension  Blood pressure is a measurement of how strongly, or weakly, your blood is pressing against the walls of your arteries. Orthostatic hypotension is a sudden drop in blood pressure that happens when you quickly change positions, such as when you get up from sitting or lying down.  Arteries are blood vessels that carry blood from your heart throughout your body. When blood pressure is too low, you may not get enough blood to your brain or to the rest of your organs. This can cause weakness, light-headedness, rapid heartbeat, and fainting. This can last for just a few seconds or for up to a few minutes. Orthostatic hypotension is usually not a serious problem. However, if it happens frequently or gets worse, it may be a sign of something more serious.  What are the causes?  This condition may be caused by:  · Sudden changes in posture, such as standing up quickly after you have been sitting or lying down.  · Blood loss.  · Loss of body fluids (dehydration).  · Heart problems.  · Hormone (endocrine) problems.  · Pregnancy.  · Severe infection.  · Lack of certain nutrients.  · Severe allergic reactions (anaphylaxis).  · Certain medicines, such as blood pressure medicine or medicines that make the body lose excess fluids (diuretics). Sometimes, this condition can be caused by not taking medicine as directed, such as taking too much of a certain medicine.  What increases the risk?  The following factors may make you more likely to develop  "this condition:  · Age. Risk increases as you get older.  · Conditions that affect the heart or the central nervous system.  · Taking certain medicines, such as blood pressure medicine or diuretics.  · Being pregnant.  What are the signs or symptoms?  Symptoms of this condition may include:  · Weakness.  · Light-headedness.  · Dizziness.  · Blurred vision.  · Fatigue.  · Rapid heartbeat.  · Fainting, in severe cases.  How is this diagnosed?  This condition is diagnosed based on:  · Your medical history.  · Your symptoms.  · Your blood pressure measurement. Your health care provider will check your blood pressure when you are:  ? Lying down.  ? Sitting.  ? Standing.  A blood pressure reading is recorded as two numbers, such as \"120 over 80\" (or 120/80). The first (\"top\") number is called the systolic pressure. It is a measure of the pressure in your arteries as your heart beats. The second (\"bottom\") number is called the diastolic pressure. It is a measure of the pressure in your arteries when your heart relaxes between beats. Blood pressure is measured in a unit called mm Hg. Healthy blood pressure for most adults is 120/80. If your blood pressure is below 90/60, you may be diagnosed with hypotension.  Other information or tests that may be used to diagnose orthostatic hypotension include:  · Your other vital signs, such as your heart rate and temperature.  · Blood tests.  · Tilt table test. For this test, you will be safely secured to a table that moves you from a lying position to an upright position. Your heart rhythm and blood pressure will be monitored during the test.  How is this treated?  This condition may be treated by:  · Changing your diet. This may involve eating more salt (sodium) or drinking more water.  · Taking medicines to raise your blood pressure.  · Changing the dosage of certain medicines you are taking that might be lowering your blood pressure.  · Wearing compression stockings. These " stockings help to prevent blood clots and reduce swelling in your legs.  In some cases, you may need to go to the hospital for:  · Fluid replacement. This means you will receive fluids through an IV.  · Blood replacement. This means you will receive donated blood through an IV (transfusion).  · Treating an infection or heart problems, if this applies.  · Monitoring. You may need to be monitored while medicines that you are taking wear off.  Follow these instructions at home:  Eating and drinking    · Drink enough fluid to keep your urine pale yellow.  · Eat a healthy diet, and follow instructions from your health care provider about eating or drinking restrictions. A healthy diet includes:  ? Fresh fruits and vegetables.  ? Whole grains.  ? Lean meats.  ? Low-fat dairy products.  · Eat extra salt only as directed. Do not add extra salt to your diet unless your health care provider told you to do that.  · Eat frequent, small meals.  · Avoid standing up suddenly after eating.  Medicines  · Take over-the-counter and prescription medicines only as told by your health care provider.  ? Follow instructions from your health care provider about changing the dosage of your current medicines, if this applies.  ? Do not stop or adjust any of your medicines on your own.  General instructions    · Wear compression stockings as told by your health care provider.  · Get up slowly from lying down or sitting positions. This gives your blood pressure a chance to adjust.  · Avoid hot showers and excessive heat as directed by your health care provider.  · Return to your normal activities as told by your health care provider. Ask your health care provider what activities are safe for you.  · Do not use any products that contain nicotine or tobacco, such as cigarettes, e-cigarettes, and chewing tobacco. If you need help quitting, ask your health care provider.  · Keep all follow-up visits as told by your health care provider. This is  important.  Contact a health care provider if you:  · Vomit.  · Have diarrhea.  · Have a fever for more than 2-3 days.  · Feel more thirsty than usual.  · Feel weak and tired.  Get help right away if you:  · Have chest pain.  · Have a fast or irregular heartbeat.  · Develop numbness in any part of your body.  · Cannot move your arms or your legs.  · Have trouble speaking.  · Become sweaty or feel light-headed.  · Faint.  · Feel short of breath.  · Have trouble staying awake.  · Feel confused.  Summary  · Orthostatic hypotension is a sudden drop in blood pressure that happens when you quickly change positions.  · Orthostatic hypotension is usually not a serious problem.  · It is diagnosed by having your blood pressure taken lying down, sitting, and then standing.  · It may be treated by changing your diet or adjusting your medicines.  This information is not intended to replace advice given to you by your health care provider. Make sure you discuss any questions you have with your health care provider.  Document Released: 12/08/2003 Document Revised: 06/13/2019 Document Reviewed: 06/13/2019  [employer] Patient Education © 2020 [employer]          Syncope  Syncope is when you pass out (faint) for a short time. It is caused by a sudden decrease in blood flow to the brain. Signs that you may be about to pass out include:  · Feeling dizzy or light-headed.  · Feeling sick to your stomach (nauseous).  · Seeing all white or all black.  · Having cold, clammy skin.  If you pass out, get help right away. Call your local emergency services (911 in the [place] Do not drive yourself to the hospital.  Follow these instructions at home:  Watch for any changes in your symptoms. Take these actions to stay safe and help with your symptoms:  Lifestyle  · Do not drive, use machinery, or play sports until your doctor says it is okay.  · Do not drink alcohol.  · Do not use any products that contain nicotine or tobacco, such as cigarettes  and e-cigarettes. If you need help quitting, ask your doctor.  · Drink enough fluid to keep your pee (urine) pale yellow.  General instructions  · Take over-the-counter and prescription medicines only as told by your doctor.  · If you are taking blood pressure or heart medicine, sit up and stand up slowly. Spend a few minutes getting ready to sit and then stand. This can help you feel less dizzy.  · Have someone stay with you until you feel stable.  · If you start to feel like you might pass out, lie down right away and raise (elevate) your feet above the level of your heart. Breathe deeply and steadily. Wait until all of the symptoms are gone.  · Keep all follow-up visits as told by your doctor. This is important.  Get help right away if:  · You have a very bad headache.  · You pass out once or more than once.  · You have pain in your chest, belly, or back.  · You have a very fast or uneven heartbeat (palpitations).  · It hurts to breathe.  · You are bleeding from your mouth or your bottom (rectum).  · You have black or tarry poop (stool).  · You have jerky movements that you cannot control (seizure).  · You are confused.  · You have trouble walking.  · You are very weak.  · You have vision problems.  These symptoms may be an emergency. Do not wait to see if the symptoms will go away. Get medical help right away. Call your local emergency services (911 in the [place] Do not drive yourself to the hospital.  Summary  · Syncope is when you pass out (faint) for a short time. It is caused by a sudden decrease in blood flow to the brain.  · Signs that you may be about to faint include feeling dizzy, light-headed, or sick to your stomach, seeing all white or all black, or having cold, clammy skin.  · If you start to feel like you might pass out, lie down right away and raise (elevate) your feet above the level of your heart. Breathe deeply and steadily. Wait until all of the symptoms are gone.  This information is not  intended to replace advice given to you by your health care provider. Make sure you discuss any questions you have with your health care provider.  Document Released: 06/05/2009 Document Revised: 01/30/2019 Document Reviewed: 01/30/2019  [employer] Patient Education © 2020 [employer]
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
## 2021-08-04 NOTE — ASSESSMENT & PLAN NOTE
Positive urine tox screen: Benzodiazepines, opiates, cannabis  Endorsed drinking 2 beers during the same time period.  Patient counseled against substance use

## 2021-08-04 NOTE — CARE PLAN
Problem: Pain - Standard  Goal: Alleviation of pain or a reduction in pain to the patient’s comfort goal  Outcome: Progressing     Problem: Knowledge Deficit - Standard  Goal: Patient and family/care givers will demonstrate understanding of plan of care, disease process/condition, diagnostic tests and medications  Outcome: Progressing     Problem: Fall Risk  Goal: Patient will remain free from falls  Outcome: Progressing     Problem: Syncope Management  Goal: Patient's signs and symptoms of syncope will be assessed and managed  Outcome: Progressing  Goal: Cardiac arrhythmias will be absent or managed  Outcome: Progressing  Goal: Patient's vital signs will be with within normal range or improve  Outcome: Progressing  Goal: Patient's risk for medication side effects that could worsen signs and symptoms of syncope will be decreased  Outcome: Progressing   The patient is Stable - Low risk of patient condition declining or worsening    Shift Goals  Clinical Goals: Echo, carotis results  Patient Goals: Go home    Progress made toward(s) clinical / shift goals:  Pt's Neuro WDL    Patient is not progressing towards the following goals:N/A

## 2024-02-01 ENCOUNTER — TELEPHONE (OUTPATIENT)
Dept: MEDICAL GROUP | Facility: PHYSICIAN GROUP | Age: 79
End: 2024-02-01
Payer: MEDICARE

## 2024-02-09 ENCOUNTER — APPOINTMENT (OUTPATIENT)
Dept: MEDICAL GROUP | Facility: PHYSICIAN GROUP | Age: 79
End: 2024-02-09
Payer: MEDICARE

## 2024-02-13 ENCOUNTER — OFFICE VISIT (OUTPATIENT)
Dept: MEDICAL GROUP | Facility: PHYSICIAN GROUP | Age: 79
End: 2024-02-13
Payer: MEDICARE

## 2024-02-13 VITALS
OXYGEN SATURATION: 95 % | TEMPERATURE: 97.6 F | HEIGHT: 70 IN | WEIGHT: 157 LBS | HEART RATE: 63 BPM | SYSTOLIC BLOOD PRESSURE: 88 MMHG | DIASTOLIC BLOOD PRESSURE: 54 MMHG | BODY MASS INDEX: 22.48 KG/M2

## 2024-02-13 DIAGNOSIS — I47.20 V-TACH (HCC): ICD-10-CM

## 2024-02-13 DIAGNOSIS — K59.01 CONSTIPATION BY DELAYED COLONIC TRANSIT: ICD-10-CM

## 2024-02-13 DIAGNOSIS — F19.10 POLYSUBSTANCE ABUSE (HCC): ICD-10-CM

## 2024-02-13 DIAGNOSIS — I50.32 CHRONIC DIASTOLIC HEART FAILURE (HCC): ICD-10-CM

## 2024-02-13 DIAGNOSIS — G89.4 CHRONIC PAIN DISORDER: ICD-10-CM

## 2024-02-13 DIAGNOSIS — J41.8 MIXED SIMPLE AND MUCOPURULENT CHRONIC BRONCHITIS (HCC): ICD-10-CM

## 2024-02-13 DIAGNOSIS — S51.811A LACERATION OF RIGHT FOREARM, INITIAL ENCOUNTER: Primary | ICD-10-CM

## 2024-02-13 DIAGNOSIS — I10 ESSENTIAL HYPERTENSION: ICD-10-CM

## 2024-02-13 PROBLEM — I95.1 ORTHOSTATIC HYPOTENSION: Status: RESOLVED | Noted: 2021-08-03 | Resolved: 2024-02-13

## 2024-02-13 PROBLEM — I25.2 HX OF MYOCARDIAL INFARCTION: Status: ACTIVE | Noted: 2024-02-13

## 2024-02-13 PROBLEM — F51.04 PSYCHOPHYSIOLOGICAL INSOMNIA: Status: ACTIVE | Noted: 2024-02-13

## 2024-02-13 PROBLEM — Z86.79 HISTORY OF CAROTID STENOSIS: Status: RESOLVED | Noted: 2021-08-03 | Resolved: 2024-02-13

## 2024-02-13 PROBLEM — M54.12 CERVICAL RADICULOPATHY: Status: ACTIVE | Noted: 2024-01-02

## 2024-02-13 PROBLEM — M06.9 RHEUMATOID ARTHRITIS (HCC): Status: ACTIVE | Noted: 2023-06-26

## 2024-02-13 PROBLEM — I25.10 CORONARY ARTERY DISEASE: Status: ACTIVE | Noted: 2022-09-23

## 2024-02-13 PROBLEM — M47.816 LUMBAR SPONDYLOSIS: Status: ACTIVE | Noted: 2023-07-02

## 2024-02-13 PROBLEM — J96.11 CHRONIC RESPIRATORY FAILURE WITH HYPOXIA AND HYPERCAPNIA (HCC): Status: ACTIVE | Noted: 2021-02-04

## 2024-02-13 PROBLEM — J96.12 CHRONIC RESPIRATORY FAILURE WITH HYPOXIA AND HYPERCAPNIA (HCC): Status: ACTIVE | Noted: 2021-02-04

## 2024-02-13 PROBLEM — M1A.09X0 CHRONIC GOUT OF MULTIPLE SITES: Status: ACTIVE | Noted: 2024-02-13

## 2024-02-13 PROBLEM — D72.829 LEUKOCYTOSIS: Status: RESOLVED | Noted: 2021-08-03 | Resolved: 2024-02-13

## 2024-02-13 PROBLEM — N40.1 BENIGN PROSTATIC HYPERPLASIA WITH LOWER URINARY TRACT SYMPTOMS: Status: ACTIVE | Noted: 2024-02-13

## 2024-02-13 PROBLEM — M19.90 ARTHRITIS: Status: ACTIVE | Noted: 2023-06-26

## 2024-02-13 PROBLEM — I50.20 HFREF (HEART FAILURE WITH REDUCED EJECTION FRACTION) (HCC): Status: RESOLVED | Noted: 2022-03-17 | Resolved: 2024-02-13

## 2024-02-13 PROBLEM — H91.90 HEARING LOSS: Status: ACTIVE | Noted: 2024-02-13

## 2024-02-13 PROBLEM — E78.5 DYSLIPIDEMIA: Status: ACTIVE | Noted: 2024-02-13

## 2024-02-13 PROBLEM — Z95.810 AICD (AUTOMATIC CARDIOVERTER/DEFIBRILLATOR) PRESENT: Status: ACTIVE | Noted: 2023-04-27

## 2024-02-13 PROBLEM — M51.36 DEGENERATION OF LUMBAR INTERVERTEBRAL DISC: Status: ACTIVE | Noted: 2022-10-17

## 2024-02-13 PROBLEM — J44.9 CHRONIC OBSTRUCTIVE PULMONARY DISEASE (HCC): Status: ACTIVE | Noted: 2024-02-13

## 2024-02-13 PROBLEM — K21.9 GASTROESOPHAGEAL REFLUX DISEASE: Status: ACTIVE | Noted: 2024-02-13

## 2024-02-13 PROBLEM — J45.50 SEVERE PERSISTENT ASTHMA: Status: ACTIVE | Noted: 2021-02-04

## 2024-02-13 PROBLEM — I65.29 CAROTID ARTERY STENOSIS: Status: ACTIVE | Noted: 2023-06-28

## 2024-02-13 PROBLEM — M47.812 CERVICAL SPONDYLOSIS: Status: ACTIVE | Noted: 2024-01-02

## 2024-02-13 PROBLEM — N52.9 ERECTILE DYSFUNCTION: Status: ACTIVE | Noted: 2024-02-13

## 2024-02-13 PROBLEM — R06.00 DYSPNEA: Status: ACTIVE | Noted: 2024-01-30

## 2024-02-13 PROBLEM — J44.9 CHRONIC OBSTRUCTIVE PULMONARY DISEASE (HCC): Status: RESOLVED | Noted: 2024-02-13 | Resolved: 2024-02-13

## 2024-02-13 PROBLEM — I50.20 HFREF (HEART FAILURE WITH REDUCED EJECTION FRACTION) (HCC): Status: ACTIVE | Noted: 2022-03-17

## 2024-02-13 PROBLEM — M75.120 FULL THICKNESS ROTATOR CUFF TEAR: Status: ACTIVE | Noted: 2023-08-28

## 2024-02-13 PROBLEM — I73.9 PVD (PERIPHERAL VASCULAR DISEASE) (HCC): Status: ACTIVE | Noted: 2020-11-04

## 2024-02-13 PROCEDURE — 3078F DIAST BP <80 MM HG: CPT | Performed by: FAMILY MEDICINE

## 2024-02-13 PROCEDURE — 3074F SYST BP LT 130 MM HG: CPT | Performed by: FAMILY MEDICINE

## 2024-02-13 PROCEDURE — 99215 OFFICE O/P EST HI 40 MIN: CPT | Performed by: FAMILY MEDICINE

## 2024-02-13 RX ORDER — SULFAMETHOXAZOLE AND TRIMETHOPRIM 800; 160 MG/1; MG/1
TABLET ORAL
COMMUNITY
Start: 2024-01-27 | End: 2024-02-13

## 2024-02-13 RX ORDER — ALBUTEROL SULFATE 90 UG/1
AEROSOL, METERED RESPIRATORY (INHALATION)
COMMUNITY
End: 2024-03-26 | Stop reason: SINTOL

## 2024-02-13 RX ORDER — MAGNESIUM OXIDE 400 MG/1
1 TABLET ORAL 2 TIMES DAILY
COMMUNITY

## 2024-02-13 RX ORDER — OXYCODONE HYDROCHLORIDE 5 MG/1
5 TABLET ORAL
COMMUNITY
Start: 2021-02-07

## 2024-02-13 RX ORDER — PREDNISONE 20 MG/1
TABLET ORAL
COMMUNITY
Start: 2024-02-01 | End: 2024-02-18

## 2024-02-13 RX ORDER — SPIRONOLACTONE 25 MG/1
TABLET ORAL
COMMUNITY

## 2024-02-13 RX ORDER — METHYLPREDNISOLONE 4 MG/1
TABLET ORAL
COMMUNITY
Start: 2024-01-03 | End: 2024-03-26 | Stop reason: SINTOL

## 2024-02-13 RX ORDER — CEPHALEXIN 500 MG/1
CAPSULE ORAL
COMMUNITY
Start: 2024-01-27 | End: 2024-02-13

## 2024-02-13 RX ORDER — MORPHINE SULFATE 30 MG/1
TABLET, FILM COATED, EXTENDED RELEASE ORAL
COMMUNITY
Start: 2024-01-09 | End: 2024-02-13

## 2024-02-13 RX ORDER — MEPOLIZUMAB 100 MG/ML
INJECTION, SOLUTION SUBCUTANEOUS
COMMUNITY

## 2024-02-13 RX ORDER — AMIODARONE HYDROCHLORIDE 200 MG/1
TABLET ORAL
COMMUNITY
Start: 2022-09-22

## 2024-02-13 RX ORDER — DOXYCYCLINE HYCLATE 100 MG
TABLET ORAL
COMMUNITY
Start: 2024-01-31 | End: 2024-02-13

## 2024-02-13 SDOH — ECONOMIC STABILITY: FOOD INSECURITY: WITHIN THE PAST 12 MONTHS, YOU WORRIED THAT YOUR FOOD WOULD RUN OUT BEFORE YOU GOT MONEY TO BUY MORE.: NEVER TRUE

## 2024-02-13 SDOH — ECONOMIC STABILITY: TRANSPORTATION INSECURITY
IN THE PAST 12 MONTHS, HAS LACK OF RELIABLE TRANSPORTATION KEPT YOU FROM MEDICAL APPOINTMENTS, MEETINGS, WORK OR FROM GETTING THINGS NEEDED FOR DAILY LIVING?: NO

## 2024-02-13 SDOH — ECONOMIC STABILITY: INCOME INSECURITY: HOW HARD IS IT FOR YOU TO PAY FOR THE VERY BASICS LIKE FOOD, HOUSING, MEDICAL CARE, AND HEATING?: NOT VERY HARD

## 2024-02-13 SDOH — ECONOMIC STABILITY: HOUSING INSECURITY
IN THE LAST 12 MONTHS, WAS THERE A TIME WHEN YOU DID NOT HAVE A STEADY PLACE TO SLEEP OR SLEPT IN A SHELTER (INCLUDING NOW)?: NO

## 2024-02-13 SDOH — HEALTH STABILITY: MENTAL HEALTH
STRESS IS WHEN SOMEONE FEELS TENSE, NERVOUS, ANXIOUS, OR CAN'T SLEEP AT NIGHT BECAUSE THEIR MIND IS TROUBLED. HOW STRESSED ARE YOU?: ONLY A LITTLE

## 2024-02-13 SDOH — HEALTH STABILITY: PHYSICAL HEALTH: ON AVERAGE, HOW MANY MINUTES DO YOU ENGAGE IN EXERCISE AT THIS LEVEL?: 0 MIN

## 2024-02-13 SDOH — ECONOMIC STABILITY: TRANSPORTATION INSECURITY
IN THE PAST 12 MONTHS, HAS THE LACK OF TRANSPORTATION KEPT YOU FROM MEDICAL APPOINTMENTS OR FROM GETTING MEDICATIONS?: NO

## 2024-02-13 SDOH — ECONOMIC STABILITY: HOUSING INSECURITY: IN THE LAST 12 MONTHS, HOW MANY PLACES HAVE YOU LIVED?: 1

## 2024-02-13 SDOH — ECONOMIC STABILITY: FOOD INSECURITY: WITHIN THE PAST 12 MONTHS, THE FOOD YOU BOUGHT JUST DIDN'T LAST AND YOU DIDN'T HAVE MONEY TO GET MORE.: NEVER TRUE

## 2024-02-13 SDOH — HEALTH STABILITY: PHYSICAL HEALTH: ON AVERAGE, HOW MANY DAYS PER WEEK DO YOU ENGAGE IN MODERATE TO STRENUOUS EXERCISE (LIKE A BRISK WALK)?: 0 DAYS

## 2024-02-13 SDOH — ECONOMIC STABILITY: INCOME INSECURITY: IN THE LAST 12 MONTHS, WAS THERE A TIME WHEN YOU WERE NOT ABLE TO PAY THE MORTGAGE OR RENT ON TIME?: NO

## 2024-02-13 SDOH — ECONOMIC STABILITY: TRANSPORTATION INSECURITY
IN THE PAST 12 MONTHS, HAS LACK OF TRANSPORTATION KEPT YOU FROM MEETINGS, WORK, OR FROM GETTING THINGS NEEDED FOR DAILY LIVING?: NO

## 2024-02-13 ASSESSMENT — ENCOUNTER SYMPTOMS
BACK PAIN: 1
SHORTNESS OF BREATH: 1
ABDOMINAL PAIN: 0
PALPITATIONS: 0
NECK PAIN: 1
CHILLS: 0
MYALGIAS: 1
FEVER: 0
DIZZINESS: 1
DIARRHEA: 0
VOMITING: 0
NAUSEA: 0

## 2024-02-13 ASSESSMENT — FIBROSIS 4 INDEX: FIB4 SCORE: 1.04

## 2024-02-13 ASSESSMENT — SOCIAL DETERMINANTS OF HEALTH (SDOH)
HOW OFTEN DO YOU ATTENT MEETINGS OF THE CLUB OR ORGANIZATION YOU BELONG TO?: NEVER
ARE YOU MARRIED, WIDOWED, DIVORCED, SEPARATED, NEVER MARRIED, OR LIVING WITH A PARTNER?: LIVING WITH PARTNER
HOW MANY DRINKS CONTAINING ALCOHOL DO YOU HAVE ON A TYPICAL DAY WHEN YOU ARE DRINKING: PATIENT DOES NOT DRINK
WITHIN THE PAST 12 MONTHS, YOU WORRIED THAT YOUR FOOD WOULD RUN OUT BEFORE YOU GOT THE MONEY TO BUY MORE: NEVER TRUE
ARE YOU MARRIED, WIDOWED, DIVORCED, SEPARATED, NEVER MARRIED, OR LIVING WITH A PARTNER?: LIVING WITH PARTNER
HOW HARD IS IT FOR YOU TO PAY FOR THE VERY BASICS LIKE FOOD, HOUSING, MEDICAL CARE, AND HEATING?: NOT VERY HARD
HOW OFTEN DO YOU ATTENT MEETINGS OF THE CLUB OR ORGANIZATION YOU BELONG TO?: NEVER
HOW OFTEN DO YOU GET TOGETHER WITH FRIENDS OR RELATIVES?: ONCE A WEEK
IN A TYPICAL WEEK, HOW MANY TIMES DO YOU TALK ON THE PHONE WITH FAMILY, FRIENDS, OR NEIGHBORS?: MORE THAN THREE TIMES A WEEK
HOW OFTEN DO YOU ATTEND CHURCH OR RELIGIOUS SERVICES?: NEVER
HOW OFTEN DO YOU HAVE A DRINK CONTAINING ALCOHOL: NEVER
HOW OFTEN DO YOU HAVE SIX OR MORE DRINKS ON ONE OCCASION: NEVER
DO YOU BELONG TO ANY CLUBS OR ORGANIZATIONS SUCH AS CHURCH GROUPS UNIONS, FRATERNAL OR ATHLETIC GROUPS, OR SCHOOL GROUPS?: NO
HOW OFTEN DO YOU GET TOGETHER WITH FRIENDS OR RELATIVES?: ONCE A WEEK
HOW OFTEN DO YOU ATTEND CHURCH OR RELIGIOUS SERVICES?: NEVER
DO YOU BELONG TO ANY CLUBS OR ORGANIZATIONS SUCH AS CHURCH GROUPS UNIONS, FRATERNAL OR ATHLETIC GROUPS, OR SCHOOL GROUPS?: NO
IN A TYPICAL WEEK, HOW MANY TIMES DO YOU TALK ON THE PHONE WITH FAMILY, FRIENDS, OR NEIGHBORS?: MORE THAN THREE TIMES A WEEK

## 2024-02-13 ASSESSMENT — LIFESTYLE VARIABLES
HOW OFTEN DO YOU HAVE SIX OR MORE DRINKS ON ONE OCCASION: NEVER
HOW MANY STANDARD DRINKS CONTAINING ALCOHOL DO YOU HAVE ON A TYPICAL DAY: PATIENT DOES NOT DRINK
AUDIT-C TOTAL SCORE: 0
HOW OFTEN DO YOU HAVE A DRINK CONTAINING ALCOHOL: NEVER
SKIP TO QUESTIONS 9-10: 1

## 2024-02-13 ASSESSMENT — PATIENT HEALTH QUESTIONNAIRE - PHQ9: CLINICAL INTERPRETATION OF PHQ2 SCORE: 0

## 2024-02-13 NOTE — PROGRESS NOTES
Subjective:   *This patient was seen in the last 3 years at my prior practice      CC:  Diagnoses of V-tach (HCC), Mixed simple and mucopurulent chronic bronchitis (HCC), Chronic diastolic heart failure (HCC), Chronic pain disorder, Laceration of right forearm, initial encounter, Polysubstance abuse (HCC), Essential hypertension, and Constipation by delayed colonic transit were pertinent to this visit.    HISTORY OF THE PRESENT ILLNESS: Patient is a 78 y.o. male. This pleasant patient is here today to establish care and discuss:  1.V-tach (HCC)    S/p AICD correction in Jan 2024  Managed by Cardiology in [place]  Patient is still feeling fatigued  -he is not sure if this is due to his COPD though    2.Mixed simple and mucopurulent chronic bronchitis (HCC)   Chronic  Managed by Pulmonology in [place]  Patient does use supplemental O2  Patient patient notes that he is chronically fatigued and short of breath    3.Chronic diastolic heart failure (HCC)    Pt's SO is concerned that his heart failure is coming back  Pt does follow with cardiology in [place]  Pt states that he is not swelling much currently  Is always short of breath  Patient just had an echo done today    4.Chronic pain disorder    Chronic  Managed by Pain management  Patient reports that overall his pain is stable and more well-controlled than it has been recently  Patient requesting palliative care consult    5.Laceration of right forearm, initial encounter    Occurred as he was leaving the house to come to his appointment.  Pt is no longer on plavix  Pt states that it is not very painful    6.Polysubstance abuse (HCC)    Hx of chronic pain  Stable on current regimen and managed by pain management in [place]    7.Essential hypertension    Chronic  Followed by cardiology in [place]  Patient just had lab work done in January for hospitalization and procedure  Patient states he is a little dizzy with his blood pressure being this low  Patient notes  "that he is already taken his medication for today     8.Constipation  New onset  Patient states it started about a month ago  Patient reports never having issues with this in the past  He denies any blood in his stool    ROS:   Review of Systems   Constitutional:  Negative for chills and fever.   Respiratory:  Positive for shortness of breath.    Cardiovascular:  Negative for chest pain and palpitations.   Gastrointestinal:  Negative for abdominal pain, diarrhea, nausea and vomiting.   Musculoskeletal:  Positive for back pain, joint pain, myalgias and neck pain.   Neurological:  Positive for dizziness.         Objective:     Exam: BP (!) 88/54 (BP Location: Left arm, Patient Position: Sitting, BP Cuff Size: Adult long)   Pulse 63   Temp 36.4 °C (97.6 °F) (Temporal)   Ht 1.765 m (5' 9.5\")   Wt 71.2 kg (157 lb)   SpO2 95%  Body mass index is 22.85 kg/m².    Physical Exam  Constitutional:       Appearance: Normal appearance.   HENT:      Head: Normocephalic and atraumatic.      Nose: Nose normal.      Mouth/Throat:      Mouth: Mucous membranes are moist.      Pharynx: Oropharynx is clear.   Eyes:      Extraocular Movements: Extraocular movements intact.      Conjunctiva/sclera: Conjunctivae normal.      Pupils: Pupils are equal, round, and reactive to light.   Pulmonary:      Effort: Pulmonary effort is normal.      Breath sounds: Decreased air movement present. Examination of the right-upper field reveals decreased breath sounds. Examination of the left-upper field reveals decreased breath sounds. Examination of the right-middle field reveals decreased breath sounds. Examination of the left-middle field reveals decreased breath sounds. Examination of the right-lower field reveals decreased breath sounds. Examination of the left-lower field reveals decreased breath sounds. Decreased breath sounds present. No wheezing, rhonchi or rales.   Abdominal:      General: Abdomen is flat. Bowel sounds are normal.      " Palpations: Abdomen is soft.   Skin:     Findings: Laceration present.             Comments: Very superficial 3afr5ad laceration to R forearm   Neurological:      General: No focal deficit present.      Mental Status: He is alert and oriented to person, place, and time.   Psychiatric:         Mood and Affect: Mood normal.               Assessment & Plan:   78 y.o. male with the following -    1. V-tach ([facility])  Hospital records from January reviewed  Currently stable and controlled  Patient to continue his amiodarone 200 mg daily  Managed by cardiology    2. Mixed simple and mucopurulent chronic bronchitis ([facility])  - Referral to Palliative Care  Chronic  Managed by pulmonology  Will place palliative care referral  At this time patient is to continue his albuterol 180 mcg as needed and his Breztri 160-9-4.8 mcg daily    3. Chronic diastolic heart failure ([facility])  - Referral to Palliative Care  Chronic  Managed by cardiology  Today's echo reviewed  Will place referral for palliative care at patient's request  At this time patient will continue his metoprolol 25 mg daily, his Lasix 20 mg daily, his spironolactone 25 mg daily    4. Chronic pain disorder  6. Polysubstance abuse ([facility])  Chronic and stable  Managed by pain management  Patient to continue his 5 mg of oxycodone every 4-6 hours daily  Patient to continue his morphine sulfate 30 mg daily    5. Laceration of right forearm, initial encounter  Very superficial without signs of infection  Discussed at home wound care    7. Essential hypertension  Chronic  Patient slightly hypotensive in office today  Recommended patient try to increase his fluid intake and to follow-up with cardiology as instructed  Patient to continue spironolactone 25 mg daily  Up-to-date on lab work    8. Constipation  Etiology unclear  Discussed increasing fiber in patient's diet  Discussed increasing water intake but to monitor any swelling in his legs given his history of heart failure  Recommended  patient's start MiraLAX daily      I spent a total of 80 minutes with record review, exam, communication with the patient, communication with other providers, and documentation of this encounter.    Return in about 6 weeks (around 3/26/2024) for Referral.    Please note that this dictation was created using voice recognition software. I have made every reasonable attempt to correct obvious errors, but I expect that there are errors of grammar and possibly content that I did not discover before finalizing the note.

## 2024-02-13 NOTE — LETTER
[facility]  Gloria Maurice M.D.  [address] [facility] [address]  Fax: [phone]   Authorization for Release/Disclosure of   Protected Health Information   Name: SLADE ADAM : 1945 SSN: [id number]   Address: [address] [address] Phone:    [phone] (home)    I authorize the entity listed below to release/disclose the PHI below to:   [facility]/Gloria Maurice M.D. and Gloria Maurice M.D.   Provider or Entity Name:  [facility]   Address   City, State, Zip  [address] [place] [address] Phone:      Fax:     Reason for request: continuity of care   Information to be released:    [  ] LAST COLONOSCOPY,  including any PATH REPORT and follow-up  [  ] LAST FIT/COLOGUARD RESULT [  ] LAST DEXA  [  ] LAST MAMMOGRAM  [  ] LAST PAP  [  ] LAST LABS [  ] RETINA EXAM REPORT  [  ] IMMUNIZATION RECORDS  [  VV] Release all info      [  ] Check here and initial the line next to each item to release ALL health information INCLUDING  _____ Care and treatment for drug and / or alcohol abuse  _____ HIV testing, infection status, or AIDS  _____ Genetic Testing    DATES OF SERVICE OR TIME PERIOD TO BE DISCLOSED: _____________  I understand and acknowledge that:  * This Authorization may be revoked at any time by you in writing, except if your health information has already been used or disclosed.  * Your health information that will be used or disclosed as a result of you signing this authorization could be re-disclosed by the recipient. If this occurs, your re-disclosed health information may no longer be protected by State or Federal laws.  * You may refuse to sign this Authorization. Your refusal will not affect your ability to obtain treatment.  * This Authorization becomes effective upon signing and will  on (date) __________.      If no date is indicated, this Authorization will  one (1) year from the signature date.    Name: Slade Adam  Signature:  Date:   2/13/2024     PLEASE FAX REQUESTED RECORDS BACK TO: [phone]

## 2024-03-04 DIAGNOSIS — N40.1 BENIGN PROSTATIC HYPERPLASIA WITH LOWER URINARY TRACT SYMPTOMS, SYMPTOM DETAILS UNSPECIFIED: ICD-10-CM

## 2024-03-04 RX ORDER — TAMSULOSIN HYDROCHLORIDE 0.4 MG/1
0.4 CAPSULE ORAL
Qty: 90 CAPSULE | Refills: 3 | Status: SHIPPED | OUTPATIENT
Start: 2024-03-04

## 2024-03-04 RX ORDER — FINASTERIDE 5 MG/1
5 TABLET, FILM COATED ORAL EVERY MORNING
Qty: 90 TABLET | Refills: 3 | Status: SHIPPED | OUTPATIENT
Start: 2024-03-04

## 2024-03-26 ENCOUNTER — OFFICE VISIT (OUTPATIENT)
Dept: MEDICAL GROUP | Facility: PHYSICIAN GROUP | Age: 79
End: 2024-03-26
Payer: MEDICARE

## 2024-03-26 VITALS
WEIGHT: 160.6 LBS | OXYGEN SATURATION: 91 % | BODY MASS INDEX: 22.99 KG/M2 | HEART RATE: 64 BPM | TEMPERATURE: 98.5 F | DIASTOLIC BLOOD PRESSURE: 56 MMHG | HEIGHT: 70 IN | SYSTOLIC BLOOD PRESSURE: 90 MMHG

## 2024-03-26 DIAGNOSIS — J96.11 CHRONIC RESPIRATORY FAILURE WITH HYPOXIA AND HYPERCAPNIA (HCC): ICD-10-CM

## 2024-03-26 DIAGNOSIS — I50.32 CHRONIC DIASTOLIC HEART FAILURE (HCC): ICD-10-CM

## 2024-03-26 DIAGNOSIS — J96.12 CHRONIC RESPIRATORY FAILURE WITH HYPOXIA AND HYPERCAPNIA (HCC): ICD-10-CM

## 2024-03-26 DIAGNOSIS — M19.90 ARTHRITIS: ICD-10-CM

## 2024-03-26 DIAGNOSIS — G89.4 CHRONIC PAIN DISORDER: ICD-10-CM

## 2024-03-26 PROBLEM — I42.9 CARDIOMYOPATHY (HCC): Status: ACTIVE | Noted: 2024-03-07

## 2024-03-26 PROBLEM — B19.20 VIRAL HEPATITIS C: Status: ACTIVE | Noted: 2023-06-26

## 2024-03-26 PROCEDURE — 3074F SYST BP LT 130 MM HG: CPT | Performed by: FAMILY MEDICINE

## 2024-03-26 PROCEDURE — 3078F DIAST BP <80 MM HG: CPT | Performed by: FAMILY MEDICINE

## 2024-03-26 PROCEDURE — 99214 OFFICE O/P EST MOD 30 MIN: CPT | Performed by: FAMILY MEDICINE

## 2024-03-26 RX ORDER — METHYLPREDNISOLONE 4 MG/1
TABLET ORAL
Qty: 21 TABLET | Refills: 1 | Status: SHIPPED | OUTPATIENT
Start: 2024-03-26

## 2024-03-26 ASSESSMENT — ENCOUNTER SYMPTOMS
ABDOMINAL PAIN: 0
NAUSEA: 0
SHORTNESS OF BREATH: 1
COUGH: 1
VOMITING: 0
PALPITATIONS: 0

## 2024-03-26 ASSESSMENT — FIBROSIS 4 INDEX: FIB4 SCORE: 0.5

## 2024-03-26 NOTE — PROGRESS NOTES
"Subjective:     CC: F/U Referrals    HPI:   Slade presents today with    Problem   Cardiomyopathy (Hcc)   Chronic Diastolic Heart Failure (Hcc)    Chronic  Patient is managed by Cardiology in [place]  After patient's most recent Echo he is in Stage 3 CHF  Palliative care is coming every 6 weeks     Chronic Pain Disorder    Chronic  Uncontrolled  Managed by Pain management  Palliative care is coming every 6 weeks  -is looking into taking over care for his pain  Patient's SO notes that Pain management has informed him that they will not ever increase his medication       Arthritis    Palliative care is coming every 6 weeks       Viral Hepatitis C   Chronic Respiratory Failure With Hypoxia and Hypercapnia (Hcc)    Chronic  On supplemental O2  Managed by Pulmonology in [place]  Palliative care is coming out every 6 weeks             ROS:  Review of Systems   Respiratory:  Positive for cough and shortness of breath.    Cardiovascular:  Negative for chest pain and palpitations.   Gastrointestinal:  Negative for abdominal pain, nausea and vomiting.       Objective:     Exam:  BP 90/56 (BP Location: Left arm, Patient Position: Sitting, BP Cuff Size: Adult long)   Pulse 64   Temp 36.9 °C (98.5 °F) (Temporal)   Ht 1.765 m (5' 9.5\")   Wt 72.8 kg (160 lb 9.6 oz)   SpO2 91%   BMI 23.38 kg/m²  Body mass index is 23.38 kg/m².    Physical Exam  Constitutional:       Appearance: Normal appearance.   HENT:      Head: Normocephalic and atraumatic.      Mouth/Throat:      Mouth: Mucous membranes are moist.   Eyes:      Extraocular Movements: Extraocular movements intact.      Conjunctiva/sclera: Conjunctivae normal.      Pupils: Pupils are equal, round, and reactive to light.   Cardiovascular:      Rate and Rhythm: Normal rate and regular rhythm.      Heart sounds: No murmur heard.  Pulmonary:      Effort: Prolonged expiration present.      Breath sounds: Examination of the right-upper field reveals decreased breath " sounds. Examination of the left-upper field reveals decreased breath sounds. Examination of the right-middle field reveals decreased breath sounds. Examination of the left-middle field reveals decreased breath sounds. Examination of the right-lower field reveals decreased breath sounds. Examination of the left-lower field reveals decreased breath sounds. Decreased breath sounds present. No wheezing.   Abdominal:      General: Abdomen is flat. Bowel sounds are normal.      Palpations: Abdomen is soft.   Musculoskeletal:      Cervical back: Neck supple.   Skin:     General: Skin is warm and dry.   Neurological:      General: No focal deficit present.      Mental Status: He is alert and oriented to person, place, and time.   Psychiatric:         Mood and Affect: Mood normal.           Assessment & Plan:     78 y.o. male with the following -    1. Arthritis  3. Chronic pain disorder  - methylPREDNISolone (MEDROL DOSEPAK) 4 MG Tablet Therapy Pack; Take tablets as indicated by pack  Dispense: 21 Tablet; Refill: 1    Chronic  Uncontrolled  Primarily managed by pain management however palliative care is looking into taking over  Patient is to continue morphine sulfate 15 mg daily, oxycodone 5 mg  Will send in a Medrol Dosepak  -Patient will check with cardiology to see if his heart can tolerate low-dose daily steroid    2. Chronic diastolic heart failure (HCC)    Chronic  Stable  Managed by cardiology  Will send in a Medrol Dosepak patient's chronic pain  -Patient will check with cardiology to see if his heart can tolerate low-dose daily steroid  Most recent cardiology records reviewed    4. Chronic respiratory failure with hypoxia and hypercapnia (HCC)    Chronic  Managed by pulmonology  Most recent records reviewed  Patient is to continue his inhalers and supplemental oxygen as instructed    I spent a total of 32 minutes with record review, exam, communication with the patient, communication with other providers, and  documentation of this encounter.      Return in about 3 months (around 6/26/2024) for Med F/U.    Please note that this dictation was created using voice recognition software. I have made every reasonable attempt to correct obvious errors, but I expect that there are errors of grammar and possibly content that I did not discover before finalizing the note.

## 2024-03-28 DIAGNOSIS — M19.90 ARTHRITIS: ICD-10-CM

## 2024-03-28 RX ORDER — PREDNISONE 20 MG/1
20 TABLET ORAL DAILY
Qty: 30 TABLET | Refills: 0 | Status: SHIPPED | OUTPATIENT
Start: 2024-03-28

## 2024-04-26 ENCOUNTER — TELEPHONE (OUTPATIENT)
Dept: MEDICAL GROUP | Facility: PHYSICIAN GROUP | Age: 79
End: 2024-04-26
Payer: MEDICARE

## 2024-04-26 NOTE — TELEPHONE ENCOUNTER
VOICEMAIL  1. Caller Name: Kashmir Washburn [facility]                        Call Back Number: [phone]    2. Message: Officer calling to ask if Dr. Maurice will sign his death cert and burial permit.    3. Patient approves office to leave a detailed voicemail/MyChart message: N\A